# Patient Record
Sex: FEMALE | Race: WHITE | NOT HISPANIC OR LATINO | URBAN - METROPOLITAN AREA
[De-identification: names, ages, dates, MRNs, and addresses within clinical notes are randomized per-mention and may not be internally consistent; named-entity substitution may affect disease eponyms.]

---

## 2021-07-06 ENCOUNTER — COMMUNICATION - HEALTHEAST (OUTPATIENT)
Dept: SCHEDULING | Facility: CLINIC | Age: 31
End: 2021-07-06

## 2021-07-06 NOTE — TELEPHONE ENCOUNTER
Telephone Encounter by Anna Trujillo RN at 7/6/2021  3:18 PM     Author: Anna Trujillo RN Service: -- Author Type: Registered Nurse    Filed: 7/6/2021  3:19 PM Encounter Date: 7/6/2021 Status: Signed    : Anna Trujillo, RN (Registered Nurse)       Patient calling, and has wrong number.     Anna Trujillo RN  [facility] Triage/refill nurse

## 2024-10-14 ENCOUNTER — VIRTUAL VISIT (OUTPATIENT)
Dept: PHARMACY | Facility: PHYSICIAN GROUP | Age: 34
End: 2024-10-14
Payer: COMMERCIAL

## 2024-10-14 DIAGNOSIS — M79.7 FIBROMYALGIA: Primary | ICD-10-CM

## 2024-10-14 DIAGNOSIS — Z3A.12 12 WEEKS GESTATION OF PREGNANCY: ICD-10-CM

## 2024-10-14 DIAGNOSIS — Z78.9 TAKES DIETARY SUPPLEMENTS: ICD-10-CM

## 2024-10-14 PROCEDURE — 99605 MTMS BY PHARM NP 15 MIN: CPT | Mod: 93 | Performed by: PHARMACIST

## 2024-10-14 PROCEDURE — 99607 MTMS BY PHARM ADDL 15 MIN: CPT | Mod: 93 | Performed by: PHARMACIST

## 2024-10-14 NOTE — PROGRESS NOTES
Medication Therapy Management (MTM) Encounter    ASSESSMENT:                            Caffeine: 8 oz of coffee is 130 mg of caffiene, 12 oz of coffee stays under 200 mg daily limit    Medication Adherence/Access: No issues identified.    Supplements:   ACOG reccomends 1000 mg calcium daily, iron 27 mg daily, iodine 220 mcg daily, choline 450 mcg daily, vitamin A 770 mcg daily, vitamin C 85 mg daily, vitamin D 600 units daily, vitamin B6 1.9 mg, vitamin B12 2.6 mcg daily, folic acid 600 mcg daily for supplement intake  We reviewed that:  +BORON  PREGNANCY AND LACTATION: likely safe when used orally and appropriately. possibly unsafe when used orally in doses exceeding the UL.  +LITHIUM  PREGNANCY: POSSIBLY UNSAFE  when lithium carbonate and lithium citrate are used orally. Lithium can cause fetal toxicity and increases the risk for cardiac and other abnormalities, including neural tube and urethral defects. We usually avoid this medication/supplement in pregnancy unless needed for mood stabilization.   +MAGNESIUM  PREGNANCY AND LACTATION: likely safe when used orally and appropriately.   Recommended patient use alternate source of magnesium than the concentrace drops - will use magnesium glycinate capsules  Recommended adequate dietary intake of iron, calcium if unable to take prenatal vitamins    PLAN:                            Avoid supplements with lithium to to fetal cardiac risk- avoid Concentrace Mineral Drops  Ok to take -integrative B complex 2 capsules a day to get recommended folic acid amount  Ok to take Hi-Po emulsion D3- 2000 units - 2 drops daily  Ok to take -advanced magnesium- [employer] [facility] 2 capsules daily   Body [place] or other electrolyte drinks are a good source of potassium, sodium     Follow-up: 3 weeks with Dr Calderon, as needed with Laura for questions - you can message me in the portal     SUBJECTIVE/OBJECTIVE:                          Sumaya Lugo is a 34 year old female seen for an  initial visit. She was referred to me from Simin Calderon MD.      Reason for visit: supplement safety.    Allergies/ADRs: Reviewed in chart  Past Medical History: Reviewed in chart  Tobacco: She reports that she has never smoked. She has never used smokeless tobacco.  Alcohol: not currently using  Caffeine: wondering about caffeine intake now that her nausea has improved. Knows the daily limit is 200 mg. 1 coffee per day- about 8-12 oz    Medication Adherence/Access: no issues reported.    Supplements   /fibromylagia  -integrative B complex 1-2 capsules a day-   B2 50 mg, niacin 100 mg, B6 50 mg, B12 1 mg and Quatrefolic brand of (6S)-5-methyltetrahydrofolate 800 mcg, biotin 300 mcg   -Hi-Po emulsion D3- 2000 units - 2 drops daily  -concentrace trace mineral drops - magnesium 250 mg, chloride 650 mg, sodium 5 mg, potassium 3 mg, sulfate 40 mg, lithium 1.5 mg, boron 1 mg   -advanced magnesium- [facility] - magnesium glycinate/magnesium maleate   -body armour drinks     Has a hard time keeping electrolytes balanced and prenatal vitamins caused worsened nausea - prepregnancy was using concentrace mineral drops for electrolyte balance- wondering if she could use these or needs to try something else.    Was taking optimized fucoidan with [employer] though has stopped as this may contain iodine/heavy metals   Winter is tough with fibromyalgia pain     Drinking chocolate milk daily   Nausea is improving with farther into first trimester, pyridoxine alone isn't really helping for this  Integrative b complex tablets help for fibromyalgia flares  Feels like she absorbs vitamin D drops better than tablets         Today's Vitals: There were no vitals taken for this visit.  ----------------      I spent 22 minutes with this patient today. All changes were made via verbal approval with Simin Calderon MD. A copy of the visit note was provided to the patient's provider(s).    A summary of these recommendations was sent  via clinic portal.    Laura Hurd, Pharm.D.  Medication Therapy Management Pharmacist      Telemedicine Visit Details  The patient's medications can be safely assessed via a telemedicine encounter.  Type of service:  Telephone visit  Originating Location (pt. Location): Home    Distant Location (provider location):  On-site  Start Time: 10:30 AM  End Time: 10:52 AM     Medication Therapy Recommendations  Takes dietary supplements    Rationale: Unsafe medication for the patient - Adverse medication event - Safety   Recommendation: Discontinue Medication   Status: Accepted - no CPA Needed

## 2025-01-20 ENCOUNTER — LAB REQUISITION (OUTPATIENT)
Dept: LAB | Facility: CLINIC | Age: 35
End: 2025-01-20

## 2025-01-20 DIAGNOSIS — Z34.02 ENCOUNTER FOR SUPERVISION OF NORMAL FIRST PREGNANCY, SECOND TRIMESTER: ICD-10-CM

## 2025-01-20 PROCEDURE — 86780 TREPONEMA PALLIDUM: CPT | Performed by: STUDENT IN AN ORGANIZED HEALTH CARE EDUCATION/TRAINING PROGRAM

## 2025-01-22 LAB — T PALLIDUM AB SER QL: NONREACTIVE

## 2025-03-17 ENCOUNTER — LAB REQUISITION (OUTPATIENT)
Dept: LAB | Facility: CLINIC | Age: 35
End: 2025-03-17

## 2025-03-17 DIAGNOSIS — Z34.00 ENCOUNTER FOR SUPERVISION OF NORMAL FIRST PREGNANCY, UNSPECIFIED TRIMESTER: ICD-10-CM

## 2025-03-17 PROCEDURE — 87653 STREP B DNA AMP PROBE: CPT | Performed by: STUDENT IN AN ORGANIZED HEALTH CARE EDUCATION/TRAINING PROGRAM

## 2025-03-18 LAB — GP B STREP DNA SPEC QL NAA+PROBE: NEGATIVE
